# Patient Record
(demographics unavailable — no encounter records)

---

## 2025-07-21 NOTE — PHYSICAL EXAM
[Normocephalic] : normocephalic [EOMI] : extra ocular movement intact [Supple] : supple [No Supraclavicular Adenopathy] : no supraclavicular adenopathy [No Cervical Adenopathy] : no cervical adenopathy [de-identified] : R breast/axilla/supraclavicular area: No evidence of recurrence\par   [de-identified] : L breast/axilla/supraclavicular area: No masses, discharge, or adenopathy\par

## 2025-07-21 NOTE — PAST MEDICAL HISTORY
[Perimenopausal] : The patient is perimenopausal [Menarche Age ____] : age at menarche was [unfilled] [Definite ___ (Date)] : the last menstrual period was [unfilled] [Irregular Cycle Intervals] : are  irregular [Total Preg ___] : G[unfilled] [Abortions ___] : Abortions:[unfilled] [History of Hormone Replacement Treatment] : has no history of hormone replacement treatment [FreeTextEntry6] : No [FreeTextEntry7] : yes [FreeTextEntry8] : n/a

## 2025-07-21 NOTE — HISTORY OF PRESENT ILLNESS
[FreeTextEntry1] : Patient is a 55yo  F here for breast cancer surveillance. 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 S/p RT (Dr. Robles). Patient met with Dr. Lozoya who recommended Tamoxifen therapy, but patient declined. 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 Hx of RIGHT excisional bx in 2014 (age 45) for papillary lesion. Fhx of breast cancer including sister (age 42, no genetics). Patient is BRCA negative with VUS in [facility] (tested in 2021). Patient denies palpable masses, skin changes, or nipple discharge bilaterally. Of note, patient has hx of thyroid cancer in 1991 (age 21) treated w/ thyroidectomy.  TNM Staging: pTis, N0, M0 1/18/23: [facility]  7/23/19: B/L MG- dense; L- faint group calcs UOQ; grouped calcs central; B/L circumscribed masses, consistent with fibrocystic changes. Rec L DX MG. 7/23/19: B/L US- R- 3.3cm dilated ducts, 0.7cm cyst debris 9:00; simple cysts x3. L- 0.7cm cyst w/debris 3:00; dilated ducts with debris 1:00. 8/26/19: L MG- loosely grouped round and punctate calcs in UOQ, within this group there is 0.4cm amorphous calcs in UOQ posterior - indeterminate. 2nd group calcs central - .Rec L Stereo Bx x2. Management additional calcs UOQ based on results of L Stereo BX x2. 9/24/19: L Stereo BX x2- SITE 1) L - UOQ Posterior - sclerosing IDP Cylinder clip. SITE 2) L Outer Central Anterior - IDP; [place] clip. *Reviewed w/ Dr Sharpe, additional calcs not as tightly grouped, not suspicious. 10/29/19: L NL Lumpectomy x 2- SITE 1) L 4:00 - sclerosing IDP with florid ductal hyperplasia. SITE 2) L 12:00 - sclerosing papilloma & LCIS 7/24/20: B/l MG & US- cysts.  1/25/21 MRI- bilateral enhancing foci could be c/w small papillomata vs physiologic enhancement, R- 1 cm nodule likely benign intramamm LN rec six month f/u MRI 6/30/21: B/L MG & US- Dense. L postsurgical changes. Benign clacs. L tissue marker, stable. US- Fibrocystic changes. BIRADS 2.  8/11/21: MRI- Dense. R 3.5cm UOQ stippled/clumped enhancement (Rec. MRI guided bx). Posterior to this lesion is a 1cm mass suggestive of an intramammary LN. BIRADS 4.  8/23/21: R MRI guided bx for UOQ enhancement- "Cylinder" clip. Flat epithelial atypia. Focal atypical lobular hyperplasia. Minute intraductal papilloma. Calcifications associated with atypical and surrounding epithelium. High risk and concordant. 10/26/21: R NLOC lumpectomy- 1.4cm DCIS, low nuclear grade. ER/WI +.  ADH, FEA, IDP. <0.1cm from anterior and inferior margin. 11/30/21: R reexcision for DCIS at the anterior-inferior margin- final surgical path yielded low grade DCIS spanning 1.5cm, DCIS is present less than 1 mm from final resection, with ADH and FEA.  7/6/22: B/l MG & US- heterogeneously dense. R new postsurgical changes. US- B/l cysts w/ debris. BI-RADS 2 1/18/22: MRI- b/l postsurgical changes, KIMI. hyperintense finding L kidney, recc US if not previously worked up. BIRADS 2  7/10/23: B/l MG & US- heterogeneously dense, KIMI. BIRADS 2. 1/12/24: MRI- R stable posttreatment changes. No MRI evidence of malignancy. BI-RADS 2 7/15/24: B/l MG/US-heterogeneously dense.  Developing asymmetry subareolar aspect, stable.  L slightly upper central aspect subtle amorphous calcifications measuring up to 4.1 cm, suspicious (rec stereotactic biopsy).  US-B/L postsurgical changes, stable.  R 9:00 2 CFN stable benign-appearing cyst. BIRADS 4 8/28/24 L stereo bx (calcs) (cork)- Fibrocystic changes associated with calcifications. Benign and Concordant. six month follow up mg. 1/22/2025 L MG-heterogeneously dense.  S shaped clip and cork shaped clip noted.  Faint punctate loosely grouped microcalcifications noted anterior to cork clip, unchanged.  BI-RADS 2 1/22/2025 MRI-fibroglandular.  B/L mildly dilated ducts, stable.  R postsurgical changes.  BI-RADS 2 7/21/2025 B/L MG and US-scattered fibroglandular. R postsurgical scarring. L tissue markers.  US-R 9:00 0.6 cm cluster of cysts. B/l postsurgical scarring. L 3:00 1cfn 1cm cyst. L 11:00 4cfn 0.3cm cyst. BIRADS 2

## 2025-07-21 NOTE — PHYSICAL EXAM
[Normocephalic] : normocephalic [EOMI] : extra ocular movement intact [Supple] : supple [No Supraclavicular Adenopathy] : no supraclavicular adenopathy [No Cervical Adenopathy] : no cervical adenopathy [de-identified] : R breast/axilla/supraclavicular area: No evidence of recurrence\par   [de-identified] : L breast/axilla/supraclavicular area: No masses, discharge, or adenopathy\par

## 2025-07-21 NOTE — HISTORY OF PRESENT ILLNESS
[FreeTextEntry1] : Patient is a 57yo  F here for breast cancer surveillance. S/p RIGHT lumpectomy and re-excision 10/2021 (age 54) yielding 1.4 cm DCIS (ER/CA+), negative margins. S/p RT (Dr. Robles). Patient met with Dr. Lozoya who recommended Tamoxifen therapy, but patient declined. 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 Hx of RIGHT excisional bx in 2014 (age 45) for papillary lesion. Fhx of breast cancer including sister (age 42, no genetics). Patient is BRCA negative with VUS in [facility] (tested in 2021). Patient denies palpable masses, skin changes, or nipple discharge bilaterally. Of note, patient has hx of thyroid cancer in 1991 (age 21) treated w/ thyroidectomy.  TNM Staging: pTis, N0, M0 1/18/23: [facility] Registry  7/23/19: B/L MG- dense; L- faint group calcs UOQ; grouped calcs central; B/L circumscribed masses, consistent with fibrocystic changes. Rec L DX MG. 7/23/19: B/L US- R- 3.3cm dilated ducts, 0.7cm cyst debris 9:00; simple cysts x3. L- 0.7cm cyst w/debris 3:00; dilated ducts with debris 1:00. 8/26/19: L MG- loosely grouped round and punctate calcs in UOQ, within this group there is 0.4cm amorphous calcs in UOQ posterior - indeterminate. 2nd group calcs central - .Rec L Stereo Bx x2. Management additional calcs UOQ based on results of L Stereo BX x2. 9/24/19: L Stereo BX x2- SITE 1) L - UOQ Posterior - sclerosing IDP Cylinder clip. SITE 2) L Outer Central Anterior - IDP; [place] clip. *Reviewed w/ Dr Sharpe, additional calcs not as tightly grouped, not suspicious. 10/29/19: L NL Lumpectomy x 2- SITE 1) L 4:00 - sclerosing IDP with florid ductal hyperplasia. SITE 2) L 12:00 - sclerosing papilloma & LCIS 7/24/20: B/l MG & US- cysts.  1/25/21 MRI- bilateral enhancing foci could be c/w small papillomata vs physiologic enhancement, R- 1 cm nodule likely benign intramamm LN rec six month f/u MRI 6/30/21: B/L MG & US- Dense. L postsurgical changes. Benign clacs. L tissue marker, stable. US- Fibrocystic changes. BIRADS 2.  8/11/21: MRI- Dense. R 3.5cm UOQ stippled/clumped enhancement (Rec. MRI guided bx). Posterior to this lesion is a 1cm mass suggestive of an intramammary LN. BIRADS 4.  8/23/21: R MRI guided bx for UOQ enhancement- "Cylinder" clip. Flat epithelial atypia. Focal atypical lobular hyperplasia. Minute intraductal papilloma. Calcifications associated with atypical and surrounding epithelium. High risk and concordant. 10/26/21: R NLOC lumpectomy- 1.4cm DCIS, low nuclear grade. ER/CA +.  ADH, FEA, IDP. <0.1cm from anterior and inferior margin. 11/30/21: R reexcision for DCIS at the anterior-inferior margin- final surgical path yielded low grade DCIS spanning 1.5cm, DCIS is present less than 1 mm from final resection, with ADH and FEA.  7/6/22: B/l MG & US- heterogeneously dense. R new postsurgical changes. US- B/l cysts w/ debris. BI-RADS 2 1/18/22: MRI- b/l postsurgical changes, KIMI. hyperintense finding L kidney, recc US if not previously worked up. BIRADS 2  7/10/23: B/l MG & US- heterogeneously dense, KIMI. BIRADS 2. 1/12/24: MRI- R stable posttreatment changes. No MRI evidence of malignancy. BI-RADS 2 7/15/24: B/l MG/US-heterogeneously dense.  Developing asymmetry subareolar aspect, stable.  L slightly upper central aspect subtle amorphous calcifications measuring up to 4.1 cm, suspicious (rec stereotactic biopsy).  US-B/L postsurgical changes, stable.  R 9:00 2 CFN stable benign-appearing cyst. BIRADS 4 8/28/24 L stereo bx (calcs) (cork)- Fibrocystic changes associated with calcifications. Benign and Concordant. six month follow up mg. 1/22/2025 L MG-heterogeneously dense.  S shaped clip and cork shaped clip noted.  Faint punctate loosely grouped microcalcifications noted anterior to cork clip, unchanged.  BI-RADS 2 1/22/2025 MRI-fibroglandular.  B/L mildly dilated ducts, stable.  R postsurgical changes.  BI-RADS 2 7/21/2025 B/L MG and US-scattered fibroglandular. R postsurgical scarring. L tissue markers.  US-R 9:00 0.6 cm cluster of cysts. B/l postsurgical scarring. L 3:00 1cfn 1cm cyst. L 11:00 4cfn 0.3cm cyst. BIRADS 2